# Patient Record
Sex: FEMALE | Employment: FULL TIME | URBAN - METROPOLITAN AREA
[De-identification: names, ages, dates, MRNs, and addresses within clinical notes are randomized per-mention and may not be internally consistent; named-entity substitution may affect disease eponyms.]

---

## 2019-04-10 ENCOUNTER — HOSPITAL ENCOUNTER (EMERGENCY)
Facility: CLINIC | Age: 34
Discharge: HOME | End: 2019-04-10
Attending: EMERGENCY MEDICINE | Admitting: EMERGENCY MEDICINE
Payer: COMMERCIAL

## 2019-04-10 VITALS
OXYGEN SATURATION: 100 % | HEART RATE: 107 BPM | TEMPERATURE: 97.5 F | WEIGHT: 112 LBS | SYSTOLIC BLOOD PRESSURE: 133 MMHG | DIASTOLIC BLOOD PRESSURE: 106 MMHG | RESPIRATION RATE: 18 BRPM

## 2019-04-10 DIAGNOSIS — O21.0 HYPEREMESIS GRAVIDARUM: ICD-10-CM

## 2019-04-10 LAB
ANION GAP SERPL CALCULATED.3IONS-SCNC: 8 MMOL/L (ref 3–14)
BASOPHILS # BLD AUTO: 0 10E9/L (ref 0–0.2)
BASOPHILS NFR BLD AUTO: 0.4 %
BUN SERPL-MCNC: 7 MG/DL (ref 7–30)
CALCIUM SERPL-MCNC: 9.6 MG/DL (ref 8.5–10.1)
CHLORIDE SERPL-SCNC: 103 MMOL/L (ref 94–109)
CO2 SERPL-SCNC: 23 MMOL/L (ref 20–32)
CREAT SERPL-MCNC: 0.56 MG/DL (ref 0.52–1.04)
DIFFERENTIAL METHOD BLD: ABNORMAL
EOSINOPHIL # BLD AUTO: 0 10E9/L (ref 0–0.7)
EOSINOPHIL NFR BLD AUTO: 0 %
ERYTHROCYTE [DISTWIDTH] IN BLOOD BY AUTOMATED COUNT: 14.6 % (ref 10–15)
GFR SERPL CREATININE-BSD FRML MDRD: >90 ML/MIN/{1.73_M2}
GLUCOSE SERPL-MCNC: 80 MG/DL (ref 70–99)
HCT VFR BLD AUTO: 39.7 % (ref 35–47)
HGB BLD-MCNC: 12.8 G/DL (ref 11.7–15.7)
IMM GRANULOCYTES # BLD: 0 10E9/L (ref 0–0.4)
IMM GRANULOCYTES NFR BLD: 0.4 %
LYMPHOCYTES # BLD AUTO: 1.5 10E9/L (ref 0.8–5.3)
LYMPHOCYTES NFR BLD AUTO: 25.9 %
MCH RBC QN AUTO: 26.4 PG (ref 26.5–33)
MCHC RBC AUTO-ENTMCNC: 32.2 G/DL (ref 31.5–36.5)
MCV RBC AUTO: 82 FL (ref 78–100)
MONOCYTES # BLD AUTO: 0.6 10E9/L (ref 0–1.3)
MONOCYTES NFR BLD AUTO: 10.7 %
NEUTROPHILS # BLD AUTO: 3.5 10E9/L (ref 1.6–8.3)
NEUTROPHILS NFR BLD AUTO: 62.6 %
NRBC # BLD AUTO: 0 10*3/UL
NRBC BLD AUTO-RTO: 0 /100
PLATELET # BLD AUTO: 342 10E9/L (ref 150–450)
POTASSIUM SERPL-SCNC: 3.8 MMOL/L (ref 3.4–5.3)
RBC # BLD AUTO: 4.84 10E12/L (ref 3.8–5.2)
SODIUM SERPL-SCNC: 134 MMOL/L (ref 133–144)
WBC # BLD AUTO: 5.6 10E9/L (ref 4–11)

## 2019-04-10 PROCEDURE — 96361 HYDRATE IV INFUSION ADD-ON: CPT

## 2019-04-10 PROCEDURE — 99284 EMERGENCY DEPT VISIT MOD MDM: CPT | Mod: 25

## 2019-04-10 PROCEDURE — 80048 BASIC METABOLIC PNL TOTAL CA: CPT | Performed by: EMERGENCY MEDICINE

## 2019-04-10 PROCEDURE — 25000128 H RX IP 250 OP 636: Performed by: EMERGENCY MEDICINE

## 2019-04-10 PROCEDURE — 85025 COMPLETE CBC W/AUTO DIFF WBC: CPT | Performed by: EMERGENCY MEDICINE

## 2019-04-10 PROCEDURE — 96374 THER/PROPH/DIAG INJ IV PUSH: CPT

## 2019-04-10 RX ORDER — METOCLOPRAMIDE 10 MG/1
10 TABLET ORAL 3 TIMES DAILY PRN
Qty: 40 TABLET | Refills: 0 | Status: SHIPPED | OUTPATIENT
Start: 2019-04-10

## 2019-04-10 RX ORDER — SODIUM CHLORIDE 9 MG/ML
1000 INJECTION, SOLUTION INTRAVENOUS CONTINUOUS
Status: DISCONTINUED | OUTPATIENT
Start: 2019-04-10 | End: 2019-04-10 | Stop reason: HOSPADM

## 2019-04-10 RX ORDER — METOCLOPRAMIDE HYDROCHLORIDE 5 MG/ML
10 INJECTION INTRAMUSCULAR; INTRAVENOUS ONCE
Status: COMPLETED | OUTPATIENT
Start: 2019-04-10 | End: 2019-04-10

## 2019-04-10 RX ADMIN — METOCLOPRAMIDE 10 MG: 5 INJECTION, SOLUTION INTRAMUSCULAR; INTRAVENOUS at 12:10

## 2019-04-10 RX ADMIN — SODIUM CHLORIDE 1000 ML: 9 INJECTION, SOLUTION INTRAVENOUS at 12:10

## 2019-04-10 ASSESSMENT — ENCOUNTER SYMPTOMS
DIARRHEA: 0
APPETITE CHANGE: 1
FEVER: 0
VOMITING: 1
HEADACHES: 1
ABDOMINAL PAIN: 0

## 2019-04-10 NOTE — ED PROVIDER NOTES
History     Chief Complaint:  Vomiting      HPI   Jarrod Ozuna is a 33 year old female  9 weeks pregnant with history of anemia, migraines and chronic sinusitis who presents to the emergency department for evaluation of vomiting. Patient states that she has been vomiting for the pas 2.5-3 weeks. She states that whenever she has to eat or drink something she ends up bringing it all back up. She was evaluated in clinic where she was given Zofran 4 mg. She states that she had about 1 day of relief with this before her symptoms returned to where they were previously. She has been trying to eat smaller portions for meals but still ends up vomiting afterward. Patient also notes a headache. She has had 2 ultrasound so far, the first that revealed a hemorrhage, and a second that revealed it had resolved. Patient denies diarrhea, abdominal pain, fever or hematemesis. Patient notes that she has sickle cell trait and occasionally experiences sickle cell crises, she treats herself with OTC feroglobin.     Allergies:  Aspirin     Medications:    feroglobin    Past Medical History:    Sickle cell    Past Surgical History:    Past surgical history reviewed. No pertinent history.     Family History:    History reviewed. No pertinent family history.     Social History:  Social history reviewed. No pertinent social history     Review of Systems   Constitutional: Positive for appetite change. Negative for fever.   Gastrointestinal: Positive for vomiting. Negative for abdominal pain and diarrhea.   Neurological: Positive for headaches.   All other systems reviewed and are negative.        Physical Exam   First Vitals:  BP: (!) 133/106  Pulse: 107  Heart Rate: 95  Temp: 97.5  F (36.4  C)  Resp: 18  Weight: 50.8 kg (112 lb)  SpO2: 100 %      Physical Exam   Constitutional: She appears well-developed and well-nourished.   HENT:   Head: Atraumatic.   Right Ear: External ear normal.   Left Ear: External ear normal.   Mouth/Throat:  Oropharynx is clear and moist. No oropharyngeal exudate.   TM's clear bilaterally   Eyes: Pupils are equal, round, and reactive to light. Conjunctivae are normal. No scleral icterus.   Neck: Normal range of motion. Neck supple. No JVD present.   Cardiovascular: Normal rate, regular rhythm, normal heart sounds and intact distal pulses. Exam reveals no gallop and no friction rub.   No murmur heard.  Pulmonary/Chest: Effort normal and breath sounds normal. No respiratory distress. She has no wheezes. She has no rales.   Abdominal: Soft. Bowel sounds are normal. She exhibits no distension and no mass. There is no tenderness.   Musculoskeletal: Normal range of motion. She exhibits no edema.   Lymphadenopathy:     She has no cervical adenopathy.   Neurological: She is alert.   Skin: Skin is warm and dry. No rash noted.   Psychiatric: She has a normal mood and affect.         Emergency Department Course     Laboratory:  Laboratory findings were communicated with the patient who voiced understanding of the findings.    CBC: WBC 5.6, HGB 12.8,   BMP: o/w WNL (Creatinine 0.56)    Interventions:  NS 1L IV Bolus   Reglan 10 mg IV     Emergency Department Course:  Nursing notes and vitals reviewed.  I performed an exam of the patient as documented above.   I discussed the treatment plan with the patient. They expressed understanding of this plan and consented to discharge. They will be discharged home with instructions for care and follow up. In addition, the patient will return to the emergency department if their symptoms persist, worsen, if new symptoms arise or if there is any concern.  All questions were answered.    I personally reviewed the lab results with the Patient and answered all related questions prior to discharge.  Impression & Plan      Medical Decision Making:  This patient is a 33 years old female  9 weeks pregnant who presents with vomiting and nausea otherwise she appears well. On exam there is no  abdominal pain to require imaging study. Her labs are normal she does have Sickle cell trait but does not appear to be affecting her currently. She does have follow up at [facility], I did encourage her to keep this. She needs to push herself and we did discuss eating frequent small meals and she is comfortable doing that. She passed a PO challenge after Reglan as given and she seems to be doing well. She prefers to felicia Reglan at this point and I felt that was reasonable at this point given that she did not seem to have any relief with Zofran at home. She was given instructions on diet plan and follow up in the next two days. Asked to return if symptoms worsen or don't improve.     Diagnosis:    ICD-10-CM    1. Hyperemesis gravidarum O21.0      Disposition:   Discharged     Discharge Medications:     Review of your medicines      START taking      Dose / Directions   metoclopramide 10 MG tablet  Commonly known as:  REGLAN      Dose:  10 mg  Take 1 tablet (10 mg) by mouth 3 times daily as needed (nausea)  Quantity:  40 tablet  Refills:  0           Where to get your medicines      Some of these will need a paper prescription and others can be bought over the counter. Ask your nurse if you have questions.    Bring a paper prescription for each of these medications    metoclopramide 10 MG tablet         Scribe Disclosure:  I, Lam Chavarria, am serving as a scribe at 1:20 PM on 4/10/2019 to document services personally performed by Terri Akers MD, based on my observations and the provider's statements to me.   [facility] EMERGENCY DEPARTMENT       Terri Akers MD  04/10/19 [id number]

## 2019-04-10 NOTE — ED AVS SNAPSHOT
[facility] Emergency Department  [address]  [facility] [address]  Phone:  [phone]  Fax:  [phone]                                    Jarrod COLON Laith   MRN: [id number]    Department:  [facility] Emergency Department   Date of Visit:  4/10/2019           After Visit Summary Signature Page    I have received my discharge instructions, and my questions have been answered. I have discussed any challenges I see with this plan with the nurse or doctor.    ..........................................................................................................................................  Patient/Patient Representative Signature      ..........................................................................................................................................  Patient Representative Print Name and Relationship to Patient    ..................................................               ................................................  Date                                   Time    ..........................................................................................................................................  Reviewed by Signature/Title    ...................................................              ..............................................  Date                                               Time          22EPIC Rev 08/18